# Patient Record
Sex: FEMALE | Race: WHITE | URBAN - METROPOLITAN AREA
[De-identification: names, ages, dates, MRNs, and addresses within clinical notes are randomized per-mention and may not be internally consistent; named-entity substitution may affect disease eponyms.]

---

## 2024-08-15 DIAGNOSIS — C43.9 MALIGNANT MELANOMA, UNSPECIFIED SITE (HCC): Primary | ICD-10-CM

## 2024-08-16 ENCOUNTER — LAB ENCOUNTER (OUTPATIENT)
Dept: LAB | Facility: HOSPITAL | Age: 49
End: 2024-08-16
Attending: SURGERY
Payer: COMMERCIAL

## 2024-08-16 DIAGNOSIS — C43.61 MALIGNANT MELANOMA OF ARM, RIGHT (HCC): Primary | ICD-10-CM

## 2024-08-16 PROCEDURE — 88321 CONSLTJ&REPRT SLD PREP ELSWR: CPT

## 2024-08-20 PROBLEM — J02.9 ACUTE PHARYNGITIS: Status: ACTIVE | Noted: 2024-08-20

## 2024-08-20 PROBLEM — J06.9 ACUTE UPPER RESPIRATORY INFECTION: Status: ACTIVE | Noted: 2024-08-20

## 2024-08-20 NOTE — H&P (VIEW-ONLY)
[facility]        Patient Name:  Tonia Bhardwaj   YOB: 1975   Gender:  Female   Appt Date:  8/21/2024   Provider:  Mg Yarbrough MD     PATIENT PROVIDERS  Referring Provider: Monique Schaeffer MD          CHIEF COMPLAINT  Chief Complaint   Patient presents with    Consult        PROBLEMS  Reviewed   Patient Active Problem List   Diagnosis    Acute pharyngitis    Acute upper respiratory infection    Cellulitis    Child attention deficit disorder    Hidradenitis    Malignant melanoma of right forearm (HCC)        History of Present Illness:  Patient is a 48 year old female who is currently being referred for consideration of surgical oncology management of recently diagnosed right forearm melanoma.     Patient was seen in office by Dr. Monique Schaeffer-->  Punch biopsy done that reveals a 0.2 mm breslow depth.     She states she's had this mole since she was little and has noticed that the borders have gotten bigger and spreading. She does recall having a history of a blistering sunburn.        Vital Signs:  /76 (BP Location: Right arm, Patient Position: Sitting, Cuff Size: adult)   Pulse 106   Temp 98.4 °F (36.9 °C) (Temporal)   Resp 20   Ht 1.562 m (5' 1.5\")   Wt 83.1 kg (183 lb 3.2 oz)   SpO2 100%   BMI 34.05 kg/m²      Medications Reviewed:    Current Outpatient Medications:     montelukast 10 MG Oral Tab, Take 1 tablet (10 mg total) by mouth daily., Disp: , Rfl:     fexofenadine (ALLEGRA ALLERGY) 180 MG Oral Tab, Take 1 tablet (180 mg total) by mouth daily., Disp: , Rfl:     COMBIPATCH 0.05-0.14 MG/DAY Transdermal Patch Biweekly, , Disp: , Rfl:      Allergies Reviewed:  Allergies   Allergen Reactions    Other RASH     Adhesive products    Sulfamethoxazole UNKNOWN    Trimethoprim UNKNOWN    Clindamycin RASH        History:  Reviewed:  Past Medical History:    ADD (attention deficit disorder)    Cellulitis    Hidradenitis      Reviewed:  History reviewed. No  pertinent surgical history.   Reviewed Social History:  Social History     Socioeconomic History    Marital status: Single   Tobacco Use    Smoking status: Never    Smokeless tobacco: Never   Vaping Use    Vaping status: Never Used   Substance and Sexual Activity    Alcohol use: Yes     Comment: 1-2 x/yr    Drug use: Never     Social Determinants of Health      Received from [facility]      Reviewed:  Family History   Problem Relation Age of Onset    Breast Cancer Mother       Review of Systems:  GENERAL HEALTH: feels well, no fatigue.   SKIN: change in mole  HEENT: denies pain  RESPIRATORY: denies shortness of breath  CARDIOVASCULAR: denies chest pain  GI: denies nausea, vomiting, constipation, diarrhea; no rectal bleeding  GENITAL/: no blood in urine  MUSCULOSKELETAL: no joint complaints, no back pain  NEURO: no tingling, numbness, weakness  ENDOCRINE: denies weight loss/gain       Physical Examination:  Constitutional: NAD.   Eyes: Sclera: non-icteric.   Neck: Neck: supple.   Lymph Nodes: Lymph Nodes no cervical LAD, supraclavicular LAD, axillary LAD, or inguinal LAD.   Lungs: Auscultation: breath sounds normal.   Cardiovascular: Heart Auscultation: RRR.   Abdomen: Soft, no masses  Musculoskeletal: Extremities: no edema.   Skin: 3 cm pigmented, irregular skin lesion right forearm.  No satellite lesions.  No in-transit metastasis.  No epitrochlear lymphadenopathy.  Remainder of skin examination unremarkable.       Document Review:  Dermpathology Report:   Case # [id number]  Skin, right lower forearm punch biopsy: invasive melanoma  Measured tumor thickness (Breslow depth): 0.2 mm   Level of invasion(anatomic nav level): II  Epidermal ulceration: Absent  Histologic type: Superficial spreading  Tumor regression: Not identified  Predominant cell type: Epithelioid   Associated Nevus: Not identified   Mitotic rate: 0 per mm squared  Tumor infiltrating lymphocytes (minimal or brisk):  Non-brisk  Peripheral: involved   Deep: Uninvolved   Primary tumor: pT1a  Lymph node pNX  Distant metastasis: pMx      Procedure(s):  None     Assessment / Plan:  Malignant melanoma of skin of right forearm, pT1a cN0  Findings were discussed with patient at length.  Entity melanoma and its staging discussed.  Patient understands that she will require a wide excision with a 1 cm margin.  Given resultant defect, patient will also meet with Dr. Roman from plastic surgery today for consideration of reconstruction.  The surgical treatment matter including indications, rationale, and risks was discussed in detail.  Patient agreed and understood.  Arrangements will be made to schedule the procedure.  Patient had ample time to ask questions.         Follow Up:  To schedule wide excision under local anesthesia pending plastic surgery consultation.       Electronically Signed by: Mg Yarbrough MD

## 2024-08-20 NOTE — CONSULTS
[facility]        Patient Name:  Tonia Bhardwaj   YOB: 1975   Gender:  Female   Appt Date:  8/21/2024   Provider:  Mg Yarbrough MD     PATIENT PROVIDERS  Referring Provider: Monique Schaeffer MD          CHIEF COMPLAINT  Chief Complaint   Patient presents with    Consult        PROBLEMS  Reviewed   Patient Active Problem List   Diagnosis    Acute pharyngitis    Acute upper respiratory infection    Cellulitis    Child attention deficit disorder    Hidradenitis    Malignant melanoma of right forearm (HCC)        History of Present Illness:  Patient is a 48 year old female who is currently being referred for consideration of surgical oncology management of recently diagnosed right forearm melanoma.     Patient was seen in office by Dr. Monique Schaeffer-->  Punch biopsy done that reveals a 0.2 mm breslow depth.     She states she's had this mole since she was little and has noticed that the borders have gotten bigger and spreading. She does recall having a history of a blistering sunburn.        Vital Signs:  /76 (BP Location: Right arm, Patient Position: Sitting, Cuff Size: adult)   Pulse 106   Temp 98.4 °F (36.9 °C) (Temporal)   Resp 20   Ht 1.562 m (5' 1.5\")   Wt 83.1 kg (183 lb 3.2 oz)   SpO2 100%   BMI 34.05 kg/m²      Medications Reviewed:    Current Outpatient Medications:     montelukast 10 MG Oral Tab, Take 1 tablet (10 mg total) by mouth daily., Disp: , Rfl:     fexofenadine (ALLEGRA ALLERGY) 180 MG Oral Tab, Take 1 tablet (180 mg total) by mouth daily., Disp: , Rfl:     COMBIPATCH 0.05-0.14 MG/DAY Transdermal Patch Biweekly, , Disp: , Rfl:      Allergies Reviewed:  Allergies   Allergen Reactions    Other RASH     Adhesive products    Sulfamethoxazole UNKNOWN    Trimethoprim UNKNOWN    Clindamycin RASH        History:  Reviewed:  Past Medical History:    ADD (attention deficit disorder)    Cellulitis    Hidradenitis      Reviewed:  History reviewed. No  pertinent surgical history.   Reviewed Social History:  Social History     Socioeconomic History    Marital status: Single   Tobacco Use    Smoking status: Never    Smokeless tobacco: Never   Vaping Use    Vaping status: Never Used   Substance and Sexual Activity    Alcohol use: Yes     Comment: 1-2 x/yr    Drug use: Never     Social Determinants of Health      Received from [facility]      Reviewed:  Family History   Problem Relation Age of Onset    Breast Cancer Mother       Review of Systems:  GENERAL HEALTH: feels well, no fatigue.   SKIN: change in mole  HEENT: denies pain  RESPIRATORY: denies shortness of breath  CARDIOVASCULAR: denies chest pain  GI: denies nausea, vomiting, constipation, diarrhea; no rectal bleeding  GENITAL/: no blood in urine  MUSCULOSKELETAL: no joint complaints, no back pain  NEURO: no tingling, numbness, weakness  ENDOCRINE: denies weight loss/gain       Physical Examination:  Constitutional: NAD.   Eyes: Sclera: non-icteric.   Neck: Neck: supple.   Lymph Nodes: Lymph Nodes no cervical LAD, supraclavicular LAD, axillary LAD, or inguinal LAD.   Lungs: Auscultation: breath sounds normal.   Cardiovascular: Heart Auscultation: RRR.   Abdomen: Soft, no masses  Musculoskeletal: Extremities: no edema.   Skin: 3 cm pigmented, irregular skin lesion right forearm.  No satellite lesions.  No in-transit metastasis.  No epitrochlear lymphadenopathy.  Remainder of skin examination unremarkable.       Document Review:  Dermpathology Report:   Case # [id number]  Skin, right lower forearm punch biopsy: invasive melanoma  Measured tumor thickness (Breslow depth): 0.2 mm   Level of invasion(anatomic nav level): II  Epidermal ulceration: Absent  Histologic type: Superficial spreading  Tumor regression: Not identified  Predominant cell type: Epithelioid   Associated Nevus: Not identified   Mitotic rate: 0 per mm squared  Tumor infiltrating lymphocytes (minimal or brisk):  Non-brisk  Peripheral: involved   Deep: Uninvolved   Primary tumor: pT1a  Lymph node pNX  Distant metastasis: pMx      Procedure(s):  None     Assessment / Plan:  Malignant melanoma of skin of right forearm, pT1a cN0  Findings were discussed with patient at length.  Entity melanoma and its staging discussed.  Patient understands that she will require a wide excision with a 1 cm margin.  Given resultant defect, patient will also meet with Dr. Roman from plastic surgery today for consideration of reconstruction.  The surgical treatment matter including indications, rationale, and risks was discussed in detail.  Patient agreed and understood.  Arrangements will be made to schedule the procedure.  Patient had ample time to ask questions.         Follow Up:  To schedule wide excision under local anesthesia pending plastic surgery consultation.       Electronically Signed by: Mg Yarbrough MD

## 2024-08-21 ENCOUNTER — OFFICE VISIT (OUTPATIENT)
Dept: SURGERY | Facility: CLINIC | Age: 49
End: 2024-08-21
Payer: COMMERCIAL

## 2024-08-21 VITALS
HEART RATE: 106 BPM | OXYGEN SATURATION: 100 % | SYSTOLIC BLOOD PRESSURE: 131 MMHG | DIASTOLIC BLOOD PRESSURE: 76 MMHG | BODY MASS INDEX: 34.14 KG/M2 | RESPIRATION RATE: 20 BRPM | TEMPERATURE: 98 F | HEIGHT: 61.5 IN | WEIGHT: 183.19 LBS

## 2024-08-21 DIAGNOSIS — C43.61 MALIGNANT MELANOMA OF RIGHT FOREARM (HCC): Primary | ICD-10-CM

## 2024-08-21 PROCEDURE — 3078F DIAST BP <80 MM HG: CPT | Performed by: SURGERY

## 2024-08-21 PROCEDURE — 3075F SYST BP GE 130 - 139MM HG: CPT | Performed by: SURGERY

## 2024-08-21 PROCEDURE — 3008F BODY MASS INDEX DOCD: CPT | Performed by: SURGERY

## 2024-08-21 PROCEDURE — 99245 OFF/OP CONSLTJ NEW/EST HI 55: CPT | Performed by: SURGERY

## 2024-08-21 PROCEDURE — 99243 OFF/OP CNSLTJ NEW/EST LOW 30: CPT | Performed by: SURGERY

## 2024-08-21 NOTE — PATIENT INSTRUCTIONS
Surgery:  Wide excision melanoma right forearm    Date of Surgery:    Hospital:    [facility]- [address] [place] [address]  Phone: [phone]    This is an Outpatient procedure.  Use the provided Chlorhexadine surgical soap(instructions attached) to shower the night before and morning of your procedure.  Do not apply powders, creams, lotions or deodorant after showering.  Do not apply any kind of makeup and make sure to remove nail polish prior to your surgery.  If you take Insulin contact your primary care physician for specific instructions regarding dosing around your surgery.  Do not drink alcohol or smoke tobacco products 24 hours prior to procedure.   Bring your picture ID and insurance card with you.  Wear comfortable clothing that can easily be removed. Preferably, something that zips, snaps, or buttons up the front.   You will be contacted by the hospital  for pre-admission COVID-19 testing and the day prior to your surgery to confirm details and give you specific instructions about when and where to arrive the day of your procedure.   If you are taking blood thinners including: Plavix, Eliquis, Xarelto, Coumadin, full strength Aspirin you will need to contact the prescribing provider for specific instructions on holding these medications for your procedure.  Inform your primary care physician of your surgery and ask if him/her will need to see you prior to surgery.  If you develop symptoms of another illness prior to surgery please contact our office immediately.   If this is an inpatient surgery, attending the Surgical Oncology Pre-operative Education Class is strongly encouraged. Email [email or web address] to RSVP or for more class information.       Pre-Operative Testing   CBC  CMP  BMP    PT, PTT, INR  UA  EKG    Chest X-Ray  Cardiac Clearance  H & P Medical Clearance     Mg Yarbrough MD, FACS  Chief of Surgical Oncology  Co-Medical Director, Oncology  Services  Professor of Surgery    For Dr. Yarbrough's office: [phone]  Fax: [phone]  After hours you will reach the answering service    Pre-Admission [phone]  Central [phone]  Medical Records:   [phone]    Diagnosis: Malignant melanoma of skin of right forearm     SURGEON: Dr. Mg Yarbrough    LOCATION: [facility]    Type: Outpatient    Length of surgery: 30 minutes  Anesthesia:MAC  Joint case with:  Dr. Roman  SA:  No   Special Equipment:   Special request:     Allergies:   Allergies   Allergen Reactions    Other RASH     Adhesive products    Sulfamethoxazole UNKNOWN    Trimethoprim UNKNOWN    Clindamycin RASH       Orders:  No  -Colon Bundle/Bowel Prep  No  -Type and cross 2 units PRBC  No  -Type and Screen  No  -Advanced Oncology Order Set (HIPEC)  No  -Heparin Pre-Op  No  -Nuclear Med Injection  No  -Wire localization needed  No  -Midline placement (HIPIC, Whipple, Esophagectomy, Liver)  No-Tylenol administration prior to surgery  Does patient have a pacemaker?: No    No  -CHG Cloths (Ricardo)    P.A.T. orders:     For RN use only:

## 2024-08-21 NOTE — PATIENT INSTRUCTIONS
Surgeon:              Dr. Julia Roman, PhD                                          Tel:         [phone]                                  Fax:        [phone]      Surgery/Procedure: Right forearm reconstruction with local flap, possible grafts, possible Integra  1.5 hours for Dr. Roman's portion, joint with Dr. Yarbrough, MAC anesthesia, outpatient  And  3 weeks later  Right forearm reconstruction with local flap, possible grafts  1.5 hours, MAC anesthesia, outpatient     Dx Code: [C43.61]     Hospital:  [facility]: [address] [place] [address]           [phone]  [facility]: [address], [place] [address]               [phone]    1. Someone will need to drive you to and from the hospital if your procedure is outpatient.    2.Do not drink alcohol or smoke 24 hours prior to your procedure.    3. Bring a picture ID and your insurance card.    4. You will be contacted by the hospital the day before to confirm the procedure time and location.     5. Do not take any herbal supplements or blood thinners at least one week before your procedure/surgery. This includes NSAID's (aspirin, baby aspirin, Motrin, Ibuprofen, Aleve, Advil, Naproxen, etc), fish oil, vitamin E, turmeric, CoQ10, or green tea supplements, etc. *TYLENOL or acetaminophen is ok to take*    6. PRE-OPERATIVE TESTING: History and physical with medical clearance is REQUIRED within 30 days of the surgery date and is mandatory per Dr. Roman. *If this is not done, your surgery will be postponed*  MEDICAL CLEARANCE WITH DR. MEYER  CBC  CMP  EKG (within 90 days)    7. Please inform us if you start or change any medications at least one week before surgery (ex: blood thinners, weight loss medications, diabetic medications, herbal supplements, etc)    8. Does patient have diagnosis of sleep apnea?    [   ] Yes     [ X ]  No    Consent obtained 8/21/2024  Photos taken on 8/21/2024

## 2024-08-21 NOTE — CONSULTS
New Patient Consultation    Chief Complaint: Malignant melanoma of right forearm     History of Present Illness:   Tonia Bhardwaj is a 48 year old female referred by Dr. Yarbrough for evaluation of a recently diagnosed right forearm melanoma. This was diagnosed on 7/31/2024 via punch biopsy. The patient met with Dr. Yarbrough who is recommending wide excision with a 1 cm margin. She is here today to discuss options for reconstruction.      Pathology from 7/31/2024:    FINAL DIAGNOSIS  1.) Skin, right lower forearm punch biopsy: Invasive melanoma. See checklist and comment.    Melanoma checklist:   Measured tumor thickness (Breslow depth): 0.2 mm   Level of invasion (anatomic/Osmany level): II   Epidermal ulceration: Absent    Additional information:   Histologic type: Favor superficial spreading type   Lymphovascular invasion: Not identified   Neurotropism: Not identified   Tumor regression: Not identified    Predominant cell tyle: Epithelioid    Associated nevus: Not identified   Mitotic rate: 0 per mm squared   Tumor infiltrating lymphocytes (minimal or brisk): Non-brisk     Tumor distance to nearest margin:   Peripheral: Involved   Deep: Uninvolved    Pathologic stage:   Primary tumor: pT1a   Lymph node: pNX   Distant metastasis: pMX        Past Medical History:      Past Medical History:    ADD (attention deficit disorder)    Cellulitis    Hidradenitis         Past Surgical History:  No past surgical history on file.      Medications:    No outpatient medications have been marked as taking for the 8/21/24 encounter (Office Visit) with Julia Roman MD.         Allergies:    Allergies   Allergen Reactions    Other RASH     Adhesive products    Sulfamethoxazole UNKNOWN    Trimethoprim UNKNOWN    Clindamycin RASH         Family History:   Family History   Problem Relation Age of Onset    Breast Cancer Mother          Social History:  History   Alcohol Use    Yes     Comment: 1-2 x/yr       History   Smoking Status     Never   Smokeless Tobacco    Never       History   Drug Use Unknown       Review of Systems:    A 13 point review of systems was performed on the intake sheet.  The patient reports   General:   The patient denies, fever, chills, night sweats, fatigue, generalized weakness, change in appetite, weight loss, or weight gain.  Endocrine:   There is no history of poor/slow wound healing, weight loss/gain, fertility or hormone problems, cold intolerance, heat intolerance, excessive thirst, or thyroid disease.   Allergic/Immunologic:  There is no history of hives, hay fever, angioedema or anaphylaxis.  HEENT:    The patient denies ear pain, ear drainage, hearing loss, change in vision, double vision, cataracts, glaucoma, nasal congestion, nosebleed, hoarseness, sore throat, or swollen glands  Respiratory:   The patient denies shortness of breath, cough, bloody cough, phlegm, asthma, or wheezing  Cardiovascular:  The patient denies chest pain/pressure, palpitations, irregular heartbeat, high blood pressure, stroke, or leg swelling  Breasts:  Patient denies breast masses, pain, change in the breast skin, skin dimpling, nipple discharge, or rash  Gastrointestinal:   There is no history of difficulty or pain with swallowing, reflux symptoms, nausea, vomiting, dark/ bloody stools, diarrhea, constipation,  change in bowel habits, or abdominal pain.   Genitourinary:  The patient denies frequent urination, needing to get up at night to urinate, urinary hesitancy or retaining urine, painful urination, urinary incontinence, decreased urine stream, blood in the urine, or vaginal/penile discharge.  Skin:   The patient denies rash, itching, skin lesions, dry skin, change in skin color or +change in moles, sunburns, or sunburns with blistering.   Hematologic/Lymphatic:  The patient denies easily bruising or bleeding, persistent swollen glands or lymph nodes, bleeding disorders, blood clots, or pulmonary embolism.   Gynecologic:  The  patient denies irregular menses, pelvic pain, pain with intercourse, painful menses, or pregnancy  Musculoskeletal:  The patient denies muscle aches/pain, joint pain, stiff joints, neck pain, back pain or bone pain.  Neurologic:  There is no history of migraines or severe headaches, seizure/epilepsy, speech problems, coordination problems, trembling/tremors, fainting/black outs, dizziness, memory problems, loss of sensation/numbness, problems walking, weakness, tingling or burning in hands/feet.   Psychiatric:  There is no history of abusive relationship, bipolar disorder, sleep disturbance, anxiety, depression or feeling of despair.    Physical Exam:    There were no vitals taken for this visit.    Constitutional: The patient is an alert, oriented and well-developed.     Neurologic: Speech patterns and movements are normal.     Psychiatric: Affect is appropriate.    Eyes: Conjunctiva are clear, non-icteric. PERRL    ENT: no obvious abnormality, no ear drainage, mucous membranes moist and pink    Integument/Skin: Right forearm pigmented lesion    Respiratory: Normal respiratory effort.     Cardiovascular: no cyanosis, no edema    Musculoskeletal: Extremities unremarkable, without edema, tenderness or deformities    Impression:   Tonia Bhardwaj  is a 48 year old with malignant melanoma right forearm    Discussion and Plan:  The patient was counseled on the different treatment options.     Dr. Yarbrough is planning for wide excision with a 1 cm margin. We discussed the option for right forearm reconstruction with local flap, possible grafts, possible Integra. The patient is interested to do this under local anesthesia, however she understands that due to the anticipated defect 1cm margin from the pigmented area she will need MAC anesthesia. Patient is agreeable with this plan. She is overall ok with scars from flaps or grafts as needed. Pictures were shown to her.     The patient reports several allergies including a  significant history of adhesive reactions. She is ok with Vaseline and Coban. We will test Steri-Strips on a small area of her arm. We will likely need to avoid any kind of surgical glue including Dermabond.     The different treatment options were discussed with the patient.  The procedures and the postoperative care was discussed in detail.  Potential risks complications benefits and alternatives were discussed.  Risks and complications including but not limited to infection, bleeding, scarring, damage to surrounding structures, such as nerves, blood vessels, muscles,  tendons, risk of hematoma, seroma, foreign body reaction, allergic reaction, wound dehiscences, delayed wound healing, graft failure, flap failure, need for further surgery.    Patient understands and wishes to proceed with the procedure, questions were answered.    45 minutes were spent with the patient, from which 35 minutes were spent counseling the patient and coordinating care.

## 2024-08-22 DIAGNOSIS — C43.61 MALIGNANT MELANOMA OF RIGHT FOREARM (HCC): Primary | ICD-10-CM

## 2024-09-05 RX ORDER — ACETAMINOPHEN 500 MG
1000 TABLET ORAL ONCE
Status: CANCELLED | OUTPATIENT
Start: 2024-09-05 | End: 2024-09-05

## 2024-09-05 RX ORDER — SODIUM CHLORIDE, SODIUM LACTATE, POTASSIUM CHLORIDE, CALCIUM CHLORIDE 600; 310; 30; 20 MG/100ML; MG/100ML; MG/100ML; MG/100ML
INJECTION, SOLUTION INTRAVENOUS CONTINUOUS
Status: CANCELLED | OUTPATIENT
Start: 2024-09-05

## 2024-09-05 RX ORDER — HEPARIN SODIUM 5000 [USP'U]/ML
5000 INJECTION, SOLUTION INTRAVENOUS; SUBCUTANEOUS ONCE
Status: CANCELLED | OUTPATIENT
Start: 2024-09-05 | End: 2024-09-05

## 2024-09-17 ENCOUNTER — ANESTHESIA (OUTPATIENT)
Dept: SURGERY | Facility: HOSPITAL | Age: 49
End: 2024-09-17
Payer: COMMERCIAL

## 2024-09-17 ENCOUNTER — ANESTHESIA EVENT (OUTPATIENT)
Dept: SURGERY | Facility: HOSPITAL | Age: 49
End: 2024-09-17
Payer: COMMERCIAL

## 2024-09-17 ENCOUNTER — HOSPITAL ENCOUNTER (OUTPATIENT)
Facility: HOSPITAL | Age: 49
Setting detail: HOSPITAL OUTPATIENT SURGERY
Discharge: HOME | End: 2024-09-17
Attending: SURGERY | Admitting: SURGERY
Payer: COMMERCIAL

## 2024-09-17 VITALS
SYSTOLIC BLOOD PRESSURE: 115 MMHG | WEIGHT: 185.63 LBS | RESPIRATION RATE: 21 BRPM | OXYGEN SATURATION: 95 % | TEMPERATURE: 98 F | DIASTOLIC BLOOD PRESSURE: 74 MMHG | HEIGHT: 62.5 IN | HEART RATE: 78 BPM | BODY MASS INDEX: 33.31 KG/M2

## 2024-09-17 DIAGNOSIS — C43.61 MALIGNANT MELANOMA OF RIGHT FOREARM (HCC): ICD-10-CM

## 2024-09-17 LAB
B-HCG UR QL: NEGATIVE
B-HCG UR QL: NEGATIVE

## 2024-09-17 PROCEDURE — 81025 URINE PREGNANCY TEST: CPT

## 2024-09-17 PROCEDURE — 88307 TISSUE EXAM BY PATHOLOGIST: CPT | Performed by: SURGERY

## 2024-09-17 PROCEDURE — 88305 TISSUE EXAM BY PATHOLOGIST: CPT | Performed by: SURGERY

## 2024-09-17 PROCEDURE — 0HXDXZZ TRANSFER RIGHT LOWER ARM SKIN, EXTERNAL APPROACH: ICD-10-PCS | Performed by: SURGERY

## 2024-09-17 PROCEDURE — 0JUG37Z SUPPLEMENT OF RIGHT LOWER ARM SUBCUTANEOUS TISSUE AND FASCIA WITH AUTOLOGOUS TISSUE SUBSTITUTE, PERCUTANEOUS APPROACH: ICD-10-PCS | Performed by: SURGERY

## 2024-09-17 RX ORDER — HEPARIN SODIUM 5000 [USP'U]/ML
5000 INJECTION, SOLUTION INTRAVENOUS; SUBCUTANEOUS ONCE
Status: COMPLETED | OUTPATIENT
Start: 2024-09-17 | End: 2024-09-17

## 2024-09-17 RX ORDER — LIDOCAINE HYDROCHLORIDE 10 MG/ML
INJECTION, SOLUTION EPIDURAL; INFILTRATION; INTRACAUDAL; PERINEURAL AS NEEDED
Status: DISCONTINUED | OUTPATIENT
Start: 2024-09-17 | End: 2024-09-17 | Stop reason: SURG

## 2024-09-17 RX ORDER — HYDROMORPHONE HYDROCHLORIDE 1 MG/ML
0.2 INJECTION, SOLUTION INTRAMUSCULAR; INTRAVENOUS; SUBCUTANEOUS EVERY 5 MIN PRN
Status: DISCONTINUED | OUTPATIENT
Start: 2024-09-17 | End: 2024-09-17

## 2024-09-17 RX ORDER — METOCLOPRAMIDE 10 MG/1
10 TABLET ORAL EVERY 8 HOURS PRN
Qty: 15 TABLET | Refills: 0 | Status: SHIPPED | OUTPATIENT
Start: 2024-09-17

## 2024-09-17 RX ORDER — NALOXONE HYDROCHLORIDE 0.4 MG/ML
0.08 INJECTION, SOLUTION INTRAMUSCULAR; INTRAVENOUS; SUBCUTANEOUS AS NEEDED
Status: DISCONTINUED | OUTPATIENT
Start: 2024-09-17 | End: 2024-09-17

## 2024-09-17 RX ORDER — SODIUM CHLORIDE, SODIUM LACTATE, POTASSIUM CHLORIDE, CALCIUM CHLORIDE 600; 310; 30; 20 MG/100ML; MG/100ML; MG/100ML; MG/100ML
INJECTION, SOLUTION INTRAVENOUS CONTINUOUS
Status: DISCONTINUED | OUTPATIENT
Start: 2024-09-17 | End: 2024-09-17

## 2024-09-17 RX ORDER — ACETAMINOPHEN 500 MG
1000 TABLET ORAL ONCE AS NEEDED
Status: DISCONTINUED | OUTPATIENT
Start: 2024-09-17 | End: 2024-09-17

## 2024-09-17 RX ORDER — HYDROCODONE BITARTRATE AND ACETAMINOPHEN 5; 325 MG/1; MG/1
2 TABLET ORAL ONCE AS NEEDED
Status: DISCONTINUED | OUTPATIENT
Start: 2024-09-17 | End: 2024-09-17

## 2024-09-17 RX ORDER — BUPIVACAINE HYDROCHLORIDE 2.5 MG/ML
INJECTION, SOLUTION EPIDURAL; INFILTRATION; INTRACAUDAL AS NEEDED
Status: DISCONTINUED | OUTPATIENT
Start: 2024-09-17 | End: 2024-09-17 | Stop reason: HOSPADM

## 2024-09-17 RX ORDER — TRAMADOL HYDROCHLORIDE 50 MG/1
50 TABLET ORAL EVERY 6 HOURS PRN
Qty: 15 TABLET | Refills: 0 | Status: SHIPPED | OUTPATIENT
Start: 2024-09-17

## 2024-09-17 RX ORDER — HYDROCODONE BITARTRATE AND ACETAMINOPHEN 5; 325 MG/1; MG/1
1 TABLET ORAL ONCE AS NEEDED
Status: DISCONTINUED | OUTPATIENT
Start: 2024-09-17 | End: 2024-09-17

## 2024-09-17 RX ORDER — LIDOCAINE HYDROCHLORIDE AND EPINEPHRINE 10; 10 MG/ML; UG/ML
INJECTION, SOLUTION INFILTRATION; PERINEURAL AS NEEDED
Status: DISCONTINUED | OUTPATIENT
Start: 2024-09-17 | End: 2024-09-17 | Stop reason: HOSPADM

## 2024-09-17 RX ORDER — PROCHLORPERAZINE EDISYLATE 5 MG/ML
5 INJECTION INTRAMUSCULAR; INTRAVENOUS EVERY 8 HOURS PRN
Status: DISCONTINUED | OUTPATIENT
Start: 2024-09-17 | End: 2024-09-17

## 2024-09-17 RX ORDER — HYDROMORPHONE HYDROCHLORIDE 1 MG/ML
0.6 INJECTION, SOLUTION INTRAMUSCULAR; INTRAVENOUS; SUBCUTANEOUS EVERY 5 MIN PRN
Status: DISCONTINUED | OUTPATIENT
Start: 2024-09-17 | End: 2024-09-17

## 2024-09-17 RX ORDER — PHENYLEPHRINE HCL 10 MG/ML
VIAL (ML) INJECTION AS NEEDED
Status: DISCONTINUED | OUTPATIENT
Start: 2024-09-17 | End: 2024-09-17 | Stop reason: SURG

## 2024-09-17 RX ORDER — ONDANSETRON 2 MG/ML
4 INJECTION INTRAMUSCULAR; INTRAVENOUS EVERY 6 HOURS PRN
Status: DISCONTINUED | OUTPATIENT
Start: 2024-09-17 | End: 2024-09-17

## 2024-09-17 RX ORDER — MIDAZOLAM HYDROCHLORIDE 1 MG/ML
INJECTION INTRAMUSCULAR; INTRAVENOUS AS NEEDED
Status: DISCONTINUED | OUTPATIENT
Start: 2024-09-17 | End: 2024-09-17 | Stop reason: SURG

## 2024-09-17 RX ORDER — HYDROMORPHONE HYDROCHLORIDE 1 MG/ML
0.4 INJECTION, SOLUTION INTRAMUSCULAR; INTRAVENOUS; SUBCUTANEOUS EVERY 5 MIN PRN
Status: DISCONTINUED | OUTPATIENT
Start: 2024-09-17 | End: 2024-09-17

## 2024-09-17 RX ORDER — ONDANSETRON 2 MG/ML
INJECTION INTRAMUSCULAR; INTRAVENOUS AS NEEDED
Status: DISCONTINUED | OUTPATIENT
Start: 2024-09-17 | End: 2024-09-17 | Stop reason: SURG

## 2024-09-17 RX ORDER — ACETAMINOPHEN 500 MG
1000 TABLET ORAL ONCE
Status: DISCONTINUED | OUTPATIENT
Start: 2024-09-17 | End: 2024-09-17 | Stop reason: HOSPADM

## 2024-09-17 RX ORDER — ONDANSETRON 2 MG/ML
INJECTION INTRAMUSCULAR; INTRAVENOUS
Status: COMPLETED
Start: 2024-09-17 | End: 2024-09-17

## 2024-09-17 RX ADMIN — SODIUM CHLORIDE, SODIUM LACTATE, POTASSIUM CHLORIDE, CALCIUM CHLORIDE: 600; 310; 30; 20 INJECTION, SOLUTION INTRAVENOUS at 08:39:00

## 2024-09-17 RX ADMIN — ONDANSETRON 4 MG: 2 INJECTION INTRAMUSCULAR; INTRAVENOUS at 08:44:00

## 2024-09-17 RX ADMIN — PHENYLEPHRINE HCL 100 MCG: 10 MG/ML VIAL (ML) INJECTION at 09:52:00

## 2024-09-17 RX ADMIN — LIDOCAINE HYDROCHLORIDE 50 MG: 10 INJECTION, SOLUTION EPIDURAL; INFILTRATION; INTRACAUDAL; PERINEURAL at 08:43:00

## 2024-09-17 RX ADMIN — MIDAZOLAM HYDROCHLORIDE 2 MG: 1 INJECTION INTRAMUSCULAR; INTRAVENOUS at 08:39:00

## 2024-09-17 RX ADMIN — SODIUM CHLORIDE, SODIUM LACTATE, POTASSIUM CHLORIDE, CALCIUM CHLORIDE: 600; 310; 30; 20 INJECTION, SOLUTION INTRAVENOUS at 10:33:00

## 2024-09-17 RX ADMIN — PHENYLEPHRINE HCL 100 MCG: 10 MG/ML VIAL (ML) INJECTION at 09:32:00

## 2024-09-17 RX ADMIN — PHENYLEPHRINE HCL 100 MCG: 10 MG/ML VIAL (ML) INJECTION at 09:24:00

## 2024-09-17 NOTE — BRIEF OP NOTE
Pre-Operative Diagnosis: Malignant melanoma of right forearm (HCC) [C43.61]     Post-Operative Diagnosis: Malignant melanoma of right forearm (HCC) [C43.61]      Procedure Performed:   Wide excision melanoma right forearm (Lin[place])    Surgeons and Role:  Panel 1:     * Mg Yarbrough MD - Primary  Panel 2:     * Julia Roman MD - Primary    Assistant(s):  PA: Christal Russell PA     Surgical Findings: See full operative report     Specimen: Yes     Estimated Blood Loss: Blood Output: 3 mL (9/17/2024  9:15 AM)    MITRA Hirsch  9/17/2024  9:19 AM

## 2024-09-17 NOTE — ANESTHESIA POSTPROCEDURE EVALUATION
[facility]    Tonia Bhardwaj Patient Status:  Hospital Outpatient Surgery   Age/Gender 49 year old female MRN [id number]   Location [facility] SURGERY Attending Mg Yarbrough MD   Hosp Day # 0 PCP Leah Sevilla MD       Anesthesia Post-op Note    Wide excision melanoma right forearm ([place])    Procedure Summary       Date: 09/17/24 Room / Location:  MAIN OR 06 / [facility] MAIN OR    Anesthesia Start: 0839 Anesthesia Stop: 1048    Procedures:       Wide excision melanoma right forearm (Linnea) (Right)      .Right forearm reconstruction with local flap and dermal fat graft   (Abel) (Right) Diagnosis:       Malignant melanoma of right forearm (HCC)      (Malignant melanoma of right forearm (HCC) [C43.61])    Surgeons: Mg Yarbrough MD; Julia Roman MD Anesthesiologist: Sha Burnett MD    Anesthesia Type: MAC ASA Status: 2            Anesthesia Type: MAC    Vitals Value Taken Time   /82 09/17/24 1049   Temp 97.2 09/17/24 1049   Pulse 97 09/17/24 1049   Resp 18 09/17/24 1049   SpO2 100 09/17/24 1049       Patient Location: PACU    Anesthesia Type: general    Airway Patency: patent and extubated    Postop Pain Control: adequate    Mental Status: preanesthetic baseline    Nausea/Vomiting: none    Cardiopulmonary/Hydration status: stable euvolemic    Complications: no apparent anesthesia related complications    Postop vital signs: stable    Dental Exam: Unchanged from Preop    Patient to be discharged from PACU when criteria met.

## 2024-09-17 NOTE — ANESTHESIA PROCEDURE NOTES
Airway  Date/Time: 9/17/2024 8:53 AM  Urgency: elective    Airway not difficult    General Information and Staff    Patient location during procedure: OR  Anesthesiologist: Sha Burnett MD  Resident/CRNA: Karl Verduzco CRNA  Performed: CRNA   Performed by: Karl Verduzco CRNA  Authorized by: Sha Burnett MD      Indications and Patient Condition  Indications for airway management: anesthesia  Spontaneous Ventilation: absent  Sedation level: deep  Preoxygenated: yes  Patient position: sniffing  MILS maintained throughout  Mask difficulty assessment: 1 - vent by mask  Planned trial extubation    Final Airway Details  Final airway type: supraglottic airway      Successful airway: classic  Size 3       Number of attempts at approach: 1  Number of other approaches attempted: 0    Additional Comments  Dentition per pre op

## 2024-09-17 NOTE — INTERVAL H&P NOTE
There has been no significant change since Dr Yarbrough saw patient as documented in EPIC.   Surgery revisted.   To proceed as planned.      Patient seen and examined by MITRA Thakur

## 2024-09-17 NOTE — DISCHARGE INSTRUCTIONS
Plastic Surgery Home Care Instructions      We hope you were pleased with your care at [facility].  We wish you the best outcome and overall experience with your operation.  These instructions will help to minimize pain, optimize healing, and improve the likelihood of a successful result.    What To Expect  There may be some spotting of the incision lines for the next few days.  Mild bruising, mild swelling and discomfort in the surgical area is typical.     Bandages (Dressing)  Wear ace wrap at all times as tolerated except for hygiene purposes. This can be adjusted as needed.   Apply antibiotic ointment (neosporin, bacitracin, etc) daily to the incisions. Apply nonstick gauze and replace ace wrap and padding as needed.     Bathing/Showers  You can resume showering tomorrow. Let soap and water run over the incision, don't scrub.   No baths, swimming, or hot tubs until you receive medical permission    Pain Medication: Tramadol  Take one tablet every six hours as needed for pain.  Do not take narcotics, if you do not have pain.   Keep stools soft.  Take a stool softener (Colace).  You can take Miralax once or twice daily and/or Milk of Magnesia daily to help with constipation if needed.   Eating fruit will also help to prevent constipation    Over-The-Counter Medication  You can take Tylenol after surgery for pain relief as needed  You can take Aleve or ibuprofen starting 24 hours after surgery  Take as directed on the bottle    Home Medication  Resume your home medications as instructed  Do not resume herbal medications for two weeks    Diet  Resume your normal diet    Activity  No strenuous activity   You cannot return to physical exercise, sports, or gym workouts until you are allowed to participate in strenuous activity.    Driving  Do not drive, if you are taking pain medication.    Return to Work or School  You can return to work when you are not taking pain medication, if your work does not involve  strenuous activity.  Contact the office, if you need a medical note.     Follow-up Appointment   Our office will call you to set up a time    Questions or Concerns  Call Dr. Roman's office if you experience bleeding that is not controlled by holding pressure for 20 minutes.     Tonia   Thank you for coming to [facility] for your operation.  The nurses and the anesthesiologist try very hard to make sure you receive the best care possible.  Your trust in them is greatly appreciated.    Thanks so much,  Dr. Abel Christian PA-C

## 2024-09-17 NOTE — ANESTHESIA PREPROCEDURE EVALUATION
PRE-OP EVALUATION    Patient Name: Tonia Bhardwaj    Admit Diagnosis: Malignant melanoma of right forearm (HCC) [C43.61]    Pre-op Diagnosis: Malignant melanoma of right forearm (HCC) [C43.61]    Wide excision melanoma right forearm ([place]) Right forearm reconstruction with local flap, possible grafts, possible Integra (Abel)    Anesthesia Procedure: Wide excision melanoma right forearm ([place]) Right forearm reconstruction with local flap, possible grafts, possible Integra (Abel) (Right)  . (Right)    Surgeons and Role:  Panel 1:     * Mg Yarbrough MD - Primary  Panel 2:     * Julia Roman MD - Primary    Pre-op vitals reviewed.  Temp: 97.8 °F (36.6 °C)  Pulse: 83  Resp: 16  BP: 140/87  SpO2: 100 %  Body mass index is 33.41 kg/m².    Current medications reviewed.  Hospital Medications:   [Transfer Hold] acetaminophen (Tylenol Extra Strength) tab 1,000 mg  1,000 mg Oral Once    lactated ringers infusion   Intravenous Continuous    [COMPLETED] heparin (Porcine) 5000 UNIT/ML injection 5,000 Units  5,000 Units Subcutaneous Once    ceFAZolin (Ancef) 2g in 10mL IV syringe premix  2 g Intravenous Once       Outpatient Medications:     Medications Prior to Admission   Medication Sig Dispense Refill Last Dose    Cyanocobalamin (VITAMIN B-12 OR) Take by mouth.   9/10/2024    CREATINE OR Take by mouth.   9/3/2024    montelukast 10 MG Oral Tab Take 1 tablet (10 mg total) by mouth daily.   9/15/2024    fexofenadine (ALLEGRA ALLERGY) 180 MG Oral Tab Take 1 tablet (180 mg total) by mouth daily.   9/15/2024    COMBIPATCH 0.05-0.14 MG/DAY Transdermal Patch Biweekly    9/10/2024       Allergies: Adhesive tape, Clindamycin, Parabens, Sulfamethoxazole, and Trimethoprim      Anesthesia Evaluation    Patient summary reviewed.    Anesthetic Complications  (-) history of anesthetic complications         GI/Hepatic/Renal    Negative GI/hepatic/renal ROS.                             Cardiovascular        Exercise tolerance: good      MET: >4                                           Endo/Other    Negative endo/other ROS.                              Pulmonary    Negative pulmonary ROS.                       Neuro/Psych    Negative neuro/psych ROS.                                  Past Surgical History:   Procedure Laterality Date    Patient denies any surgical history       Social History     Socioeconomic History    Marital status: Single   Tobacco Use    Smoking status: Never    Smokeless tobacco: Never   Vaping Use    Vaping status: Never Used   Substance and Sexual Activity    Alcohol use: Yes     Comment: RARE    Drug use: Never     History   Drug Use Unknown     Available pre-op labs reviewed.               Airway      Mallampati: II  Mouth opening: >3 FB  TM distance: 4 - 6 cm  Neck ROM: full Cardiovascular    Cardiovascular exam normal.         Dental             Pulmonary    Pulmonary exam normal.                 Other findings              ASA: 2   Plan: MAC  NPO status verified and patient meets guidelines.          Plan/risks discussed with: patient                Present on Admission:  **None**

## 2024-09-17 NOTE — BRIEF OP NOTE
Pre-Operative Diagnosis: Malignant melanoma of right forearm (HCC) [C43.61]     Post-Operative Diagnosis: Malignant melanoma of right forearm (HCC) [C43.61]      Procedure Performed:   Right forearm reconstruction with local flap, dermal fat graft    Surgeons and Role:     * Julia Roman MD - Primary    Assistant(s):  Jasmin Christian PA-C       Surgical Findings: see dictation     Specimen: see pathology     Estimated Blood Loss: Blood Output: 2 mL (9/17/2024 10:42 AM)    MITRA Stout  9/17/2024  10:49 AM

## 2024-09-18 NOTE — OPERATIVE REPORT
[facility]    PATIENT'S NAME: ANUM BARAHONA   ATTENDING PHYSICIAN: Mg Yarbrough MD   OPERATING PHYSICIAN: Mg Yarbrough MD   PATIENT ACCOUNT#:   [id number]    LOCATION:  [facility] [place] 10  MEDICAL RECORD #:   [id number]       YOB: 1975  ADMISSION DATE:       09/17/2024      OPERATION DATE:  09/17/2024    OPERATIVE REPORT      PREOPERATIVE DIAGNOSIS:  Malignant melanoma, right forearm.    POSTOPERATIVE DIAGNOSIS:  Malignant melanoma, right forearm.    PROCEDURE:    1.   Wide excision, malignant melanoma, right forearm (4.5 cm).    2.   Wound closure with Dr. Roman, Plastic Surgery.     ASSISTANT:  Christal Russell PA-C.     ANESTHESIA:  General.    INDICATIONS:  Patient is a 49-year-old woman who underwent biopsy of pigmented lesion of the right forearm revealing 0.2 mm thick melanoma.  She presents today for wide excision and reconstruction.  The surgical treatment matter was discussed with her including indications and risks.    OPERATIVE TECHNIQUE:  The patient was brought to the operating room and was placed in supine position.  She was given general anesthesia by the anesthesiology service.  Sequential compression boots were placed.  The patient received preoperative intravenous antibiotic prophylaxis.  She was prepped and draped in normal sterile fashion.  A 1 cm margin was marked around the pigmented lesion of the right forearm, and a circular incision measuring about 4.5 cm in width was made and deepened to underlying muscle fascia.  The specimen was excised in toto, oriented, sent to Pathology for permanent section evaluation.  At this point, Dr. Roman presented for wound closure, and her portion of the dictation should be noted elsewhere.    Patient tolerated my portion of the procedure well without immediate complication.    Wide Local Excision for Primary Cutaneous Melanoma - Excision 1 (Lower Arm - Right)  Operation performed with curative intent Yes    Original Breslow thickness of the lesion  0.2 mm (to the tenth of a millimeter)   Clinical margin width  (measured from the edge of the lesion or the prior excision scar) 1 cm   Depth of excision Full-thickness skin/subcutaneous tissue down to fascia (melanoma)       Dictated By Mg Yarbrough MD  d: 09/17/2024 14:12:09  t: 09/17/2024 22:12:18  [facility] [id number]  [facility]/

## 2024-09-18 NOTE — OPERATIVE REPORT
[facility]    PATIENT'S NAME: ANUM BARAHONA   ATTENDING PHYSICIAN: Mg Yarbrough MD   OPERATING PHYSICIAN: Julia Roman MD   PATIENT ACCOUNT#:   [id number]    LOCATION:  [facility] 4 [facility] 10  MEDICAL RECORD #:   [id number]       YOB: 1975  ADMISSION DATE:       09/17/2024      OPERATION DATE:  09/17/2024    OPERATIVE REPORT      PREOPERATIVE DIAGNOSIS:  Forearm melanoma; open wound, right forearm.  POSTOPERATIVE DIAGNOSIS:  Forearm melanoma; open wound, right forearm.  PROCEDURE:  Right forearm reconstruction with local flap, ulna  local rotation advancement flap, dermal fat graft.     ASSISTANT:  Jasmin Christian PA-C.    ANESTHESIA:  General endotracheal anesthesia.    COMPLICATIONS:  None.    BLOOD LOSS:  Minimal.    INDICATIONS:  This is a 49-year-old female with a 0.2 mm thick melanoma on her right forearm.  Due to the size of the melanoma including a 1 cm margin excision, the anticipated defect was large; therefore, we discussed reconstructive options in the office preoperatively and the style.  We discussed the option for flap reconstruction as well as staged approach, Integra and skin graft placements.  Potential risks, complications, benefits and alternatives were discussed.  Risks and complications including, but not limited to, infection, bleeding, scarring, damage to surrounding structures, hematoma, seroma, flap failure, need for further surgery.  The patient understands and wishes to proceed.  Questions were answered.      OPERATIVE TECHNIQUE:  The patient was identified in the preoperative area by Dr. Yarbrough and brought back to the operating room.  I was called into the operating room after Dr. Yarrbough had completed his portion.  At that time, the patient was under general anesthesia, sterilely prepped and draped, in a stable condition.  She had an open wound on her forearm measuring 6.5 x 6.5 cm.  Muscle fascia and cephalic vein were exposed.  The  subcutaneous tissue had significant step-off making skin graft reconstruction suboptimal.  Therefore, a sterile Doppler was used to identify  from the area.  Two ulna artery perforators were identified near the defect, and a flap was designed based on those perforators.  Then, 1% lidocaine with epinephrine was injected surrounding incision site.  Then, a 15 blade was used to make the curvilinear incision including those 2 perforators.  Then, electrocautery was used to dissect down through the subcutaneous fat preserving any venous branches as well as small cutaneous nerves, and the flap was then elevated.  The perforators were identified and the flap was then inset into the defect and secured with 3-0 Vicryl sutures for the initial inset.  On the corners of the flap inset, there was excess tissues.  Those dog ears were excised, and the subcutaneous portion of it was used as a dermal fat graft in the center where there was some indentation of the tissues.  Then, the inset was finished using the 3-0 Vicryl sutures for the deep dermal layer, followed by 4-0 Prolene baseball running and interrupted sutures.  Bacitracin ointment, Adaptic, Kerlix, and Ace bandage were applied.  The patient tolerated the procedure well and awoke from anesthesia without difficulty.  All sponge and needle counts were correct at the end of the case.    Dictated By Julia Roman MD  d: 09/17/2024 17:25:34  t: 09/18/2024 03:22:35  Job [id number]  [facility]/

## 2024-09-19 ENCOUNTER — TELEPHONE (OUTPATIENT)
Dept: SURGERY | Facility: CLINIC | Age: 49
End: 2024-09-19

## 2024-09-19 NOTE — TELEPHONE ENCOUNTER
Reviewed pathology with patient, residual melanoma completely excised. She expressed understanding. No issues with incision site. Experiencing tongue numbness since surgery, informed her likely from anesthesia/intubation. Will CTM.     MITRA Hirsch

## 2024-09-19 NOTE — TELEPHONE ENCOUNTER
Called to check on patient after procedure.  Patient stated she is having no pain and is only taking tylenol twice a day.  Patient stated she is having some numbness on the tip of her tongue.  She stated she spoke with a nurse yesterday day who addressed it.  Informed patient to call if numbness becomes worse or if she develops any other symptoms.  Patient confirmed post op appointment for Monday.

## 2024-09-30 ENCOUNTER — OFFICE VISIT (OUTPATIENT)
Dept: SURGERY | Facility: CLINIC | Age: 49
End: 2024-09-30
Payer: COMMERCIAL

## 2024-09-30 DIAGNOSIS — C43.61 MALIGNANT MELANOMA OF RIGHT FOREARM (HCC): Primary | ICD-10-CM

## 2024-09-30 PROCEDURE — 99024 POSTOP FOLLOW-UP VISIT: CPT | Performed by: PHYSICIAN ASSISTANT

## 2024-09-30 PROCEDURE — 99024 POSTOP FOLLOW-UP VISIT: CPT

## 2024-09-30 NOTE — PROGRESS NOTES
Tonia Bhardwaj is a 49 year old female who presents today for a follow-up after wide excision, malignant melanoma, right forearm (4.5 cm) with Dr. Yarbrough followed by right forearm reconstruction with local flap, ulna  local rotation advancement flap, and dermal fat graft with Dr. Roman on 9/17/2024.     She denies fever and chills. She denies nausea, vomiting, diarrhea or constipation.   Her pain is controlled. She is not taking narcotic pain medication. She is here today for wound check and suture removal. She notes irritation from the neosporin that has since resolved after she stopped using it.     Pathology from 9/17/2024:    Final Diagnosis:   A.  Skin, right forearm, excision:  -Residual melanoma in situ.  -The margins of excision are negative for tumor.  -Scar, consistent with prior procedure.  -See comment.     B.  Skin, right forearm, excess 6:00 distal, excision:  -Skin with no evidence of malignancy.     C.  Skin, right forearm, excess 11:00 proximal, excision:  -Skin with no evidence of malignancy.     D.  Skin, right forearm, excess 6:00, excision:  -Skin with no evidence of malignancy.     Final Diagnosis Comment    A. The closest peripheral margin is 6:00 at 9 mm from melanoma in situ.       Physical Exam     Right forearm incision clean, dry and intact. Prolene sutures intact. Minimal irritation surrounding sutures. No wound drainage or wound dehiscence. No erythema. Flap viable. No evidence of hematoma/seroma.     There were no vitals filed for this visit.      Assessment and Plan     Tonia Bhardwaj is doing well s/p wide excision, malignant melanoma, right forearm (4.5 cm) with Dr. Yarbrough followed by right forearm reconstruction with local flap, ulna  local rotation advancement flap, and dermal fat graft with Dr. Roman on 9/17/2024.    Prolene sutures were removed. Vaseline, telfa and ace wrap were applied. She will change this dressing daily. She should continue  activity restrictions and wearing the ace wrap at all times as tolerated. She can adjust this as needed.  She was encouraged to call with questions/concerns. She will follow up with Dr. Roman on 10/16/2024.    Questions were answered. Patient understands.

## 2024-09-30 NOTE — PROGRESS NOTES
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 Monique Schaeffer-->  Punch biopsy done that reveals a 0.2 mm breslow depth.      She states she's had this mole since she was little and has noticed that the borders have gotten bigger and spreading. She does recall having a history of a blistering sunburn.     Interval History:    9/17/2024: s/p Wide excision melanoma right forearm (Linnea) Right forearm reconstruction with local flap and dermal fat graft   (Abel) --> Residual melanoma in situ completely excised, margins negative     Vital Signs:  LMP  (LMP Unknown)      Medications Reviewed:    Current Outpatient Medications:     traMADol 50 MG Oral Tab, Take 1 tablet (50 mg total) by mouth every 6 (six) hours as needed., Disp: 15 tablet, Rfl: 0    metoclopramide 10 MG Oral Tab, Take 1 tablet (10 mg total) by mouth every 8 (eight) hours as needed., Disp: 15 tablet, Rfl: 0    Cyanocobalamin (VITAMIN B-12 OR), Take by mouth., Disp: , Rfl:     montelukast 10 MG Oral Tab, Take 1 tablet (10 mg total) by mouth daily., Disp: , Rfl:     fexofenadine (ALLEGRA ALLERGY) 180 MG Oral Tab, Take 1 tablet (180 mg total) by mouth daily., Disp: , Rfl:     COMBIPATCH 0.05-0.14 MG/DAY Transdermal Patch Biweekly,  , Disp: , Rfl:      Allergies Reviewed:  Allergies   Allergen Reactions    Adhesive Tape RASH    Clindamycin RASH    Parabens RASH    Sulfamethoxazole RASH    Trimethoprim RASH        History:  Reviewed:  Past Medical History:    ADD (attention deficit disorder)    NO MEDICATION    Cancer (HCC)    MELANOMA    Cellulitis    Hidradenitis    Hx of motion sickness    Low blood pressure, not hypotension    PER PATIENT BP TENDS TO RUN ON THE LOWER SIDE    Visual impairment    GLASSES      Reviewed:  Past Surgical History:   Procedure Laterality Date    Patient denies any surgical history        Reviewed Social History:  Social History     Socioeconomic History    Marital status: Single   Tobacco Use    Smoking status: Never    Smokeless tobacco: Never   Vaping Use    Vaping status: Never Used   Substance and Sexual Activity    Alcohol use: Yes     Comment: RARE    Drug use: Never     Social Determinants of Health      Received from [facility]      Reviewed:  Family History   Problem Relation Age of Onset    Breast Cancer Mother       Review of Systems:  Doing well post-operatively  Denies fever or chills  Denies chest pain or SOB  Denies abdominal pain or N/V  Denies dysuria or hematuria  Denies warmth, erythema, or drainage at incision       Physical Examination:  Constitutional:  NAD.   Eyes: non-icteric.    Abdomen: Non-distended  Musculoskeletal: no edema.  Skin: Right forearm incision site healing well without erythema or drainage.      Document Review:  Final Diagnosis:   A.  Skin, right forearm, excision:  -Residual melanoma in situ.  -The margins of excision are negative for tumor.  -Scar, consistent with prior procedure.  -See comment.     B.  Skin, right forearm, excess 6:00 distal, excision:  -Skin with no evidence of malignancy.     C.  Skin, right forearm, excess 11:00 proximal, excision:  -Skin with no evidence of malignancy.     D.  Skin, right forearm, excess 6:00, excision:  -Skin with no  evidence of malignancy.     Final Diagnosis Comment    A. The closest peripheral margin is 6:00 at 9 mm from melanoma in situ.   Synoptic Report   INVASIVE MELANOMA OF THE SKIN: Excision, Re-Excision   8th Edition - Protocol posted: 12/13/2023INVASIVE MELANOMA OF THE SKIN: EXCISION, RE-EXCISION - All Specimens  SPECIMEN   Procedure  Excision   Specimen Laterality  Right   TUMOR   Tumor Site  Skin of upper limb and / or shoulder: Forearm   Macroscopic Satellite Lesion(s)  Not identified   Histologic Type  Low-cumulative sun damage (CSD) melanoma (including superficial spreading melanoma)   Maximum Tumor (Breslow) Thickness (Millimeters)  0.2 mm (see prior biopsy [id number]; no residual invasive tumor on excision) mm   Ulceration  Not identified   Mitotic Rate  None identified   Microsatellite(s)  Not identified   Lymphatic and / or Vascular Invasion  Not identified   Neurotropism  Not identified   MARGINS   Margin Status for Invasive Melanoma  All margins negative for invasive melanoma   Distance from Invasive Melanoma to Peripheral Margin  Cannot be determined: no residual invasive tumor on excision   Distance from Invasive Melanoma to Deep Margin  Cannot be determined: no residual invasive tumor on excision   Margin Status for Melanoma In situ  All margins negative for melanoma in situ   Distance from Melanoma In Situ to Peripheral Margin  9 mm   REGIONAL LYMPH NODES   Regional Lymph Node Status  Not applicable (no regional lymph nodes submitted or found)   pTNM CLASSIFICATION (AJCC 8th Edition)   Reporting of pT, pN, and (when applicable) pM categories is based on information available to the pathologist at the time the report is issued. As per the AJCC (Chapter 1, 8th Ed.) it is the managing physician’s responsibility to establish the final pathologic stage based upon all pertinent information, including but potentially not limited to this pathology report.   Prior Procedure Classification  Classification assigned  in this report includes information from a prior procedure: Tumor depth   pT Category  pT1a   pN Category  pN not assigned (no nodes submitted or found)           Procedure(s):  None     Assessment / Plan:  Malignant melanoma of right forearm (HCC)   - No acute surgical issues  - Incision site healing well without erythema or drainage. Further management per plastic surgery team  - Pathology reviewed with patient. She expressed understanding. Residual melanoma completely excised with appropriate negative margins. Patient understands to continue to follow with Dermatology and our office for skin checks every 3-6 months.   - Reviewed signs and symptoms to return to clinic sooner    I met with Tonia Benton to provide a survivorship care plan and information related to post-treatment care. She has a history of malignant melanoma of the right forearm.     Current condition/issues:    Survivorship Care Plan: The patient was provided a hard copy of the survivorship care plan. We reviewed all elements of the document. She is aware a copy was also sent to her PCP.     Reviewed melanoma specific surveillance to include follow-up with dermatology at 3 months and follow-up with surgical oncology (Dr. Yarbrough) at 6 months. She will also continue monthly skin exams to include regional lymph node basin.     Reviewed schedule of other clinic visits: Follow-up with your primary care physician to manage all general health care recommendations for your age and gender including cancer screening tests.    Reviewed concerning signs and symptoms that she should report to any provider including the ABCDEs of melanoma.     Reviewed possible late and long-term effects related to treatment including numbness/tingling. Lymphedema less likely as no lymph nodes were removed.     Reviewed common cancer survivor issues and resource available.     Reviewed lifestyle/behaviors that can affect ongoing health including sun safety and maintaining a  healthy weight and diet.    The patient received a copy of their survivorship care plan.    The patient was given the opportunity to ask questions. She was encouraged to call with any further questions or concerns.        Follow Up:  Dermatology 3 months  Dr Yarbrough 6 months       Electronically Signed by: MITRA Hirsch

## 2024-10-01 DIAGNOSIS — C43.61 MALIGNANT MELANOMA OF RIGHT FOREARM (HCC): Primary | ICD-10-CM

## 2024-10-01 PROBLEM — J06.9 ACUTE UPPER RESPIRATORY INFECTION: Status: RESOLVED | Noted: 2024-08-20 | Resolved: 2024-10-01

## 2024-10-15 NOTE — PROGRESS NOTES
Tonia Bhardwaj is a 49 year old female who presents today for a follow-up after wide excision, malignant melanoma, right forearm (4.5 cm) with Dr. Yarbrough followed by right forearm reconstruction with local flap, ulna  local rotation advancement flap, and dermal fat graft on 9/17/2024.     She denies fever and chills. She denies nausea, vomiting, diarrhea or constipation.   Her pain is controlled.  She is here today for wound re-check.     Pathology from 9/17/2024:    Final Diagnosis:   A.  Skin, right forearm, excision:  -Residual melanoma in situ.  -The margins of excision are negative for tumor.  -Scar, consistent with prior procedure.  -See comment.     B.  Skin, right forearm, excess 6:00 distal, excision:  -Skin with no evidence of malignancy.     C.  Skin, right forearm, excess 11:00 proximal, excision:  -Skin with no evidence of malignancy.     D.  Skin, right forearm, excess 6:00, excision:  -Skin with no evidence of malignancy.      Final Diagnosis Comment     A. The closest peripheral margin is 6:00 at 9 mm from melanoma in situ.       Physical Exam     Right forearm incision clean, dry and intact. No wound drainage or wound dehiscence. No erythema. Flap viable. No evidence of hematoma/seroma.     There were no vitals filed for this visit.      Assessment and Plan     Tonia Bhardwaj is doing well s/p wide excision, malignant melanoma, right forearm (4.5 cm) with Dr. Yarbrough followed by right forearm reconstruction with local flap, ulna  local rotation advancement flap, and dermal fat graft on 9/17/2024.     We reviewed options for scar management including vitamin E oil, silicone products, scar massage and sun protection/sun block.     She will follow up in 6 months for scar check. She was encouraged to contact our office with any questions or concerns.     Questions were answered. Patient understands.

## 2024-10-16 ENCOUNTER — OFFICE VISIT (OUTPATIENT)
Dept: SURGERY | Facility: CLINIC | Age: 49
End: 2024-10-16
Payer: COMMERCIAL

## 2024-10-16 DIAGNOSIS — C43.61 MALIGNANT MELANOMA OF RIGHT FOREARM (HCC): Primary | ICD-10-CM

## 2024-10-16 PROCEDURE — 99024 POSTOP FOLLOW-UP VISIT: CPT | Performed by: SURGERY

## 2025-03-06 ENCOUNTER — TELEPHONE (OUTPATIENT)
Dept: SURGERY | Facility: CLINIC | Age: 50
End: 2025-03-06

## 2025-03-06 NOTE — TELEPHONE ENCOUNTER
Spoke with patient in regards to appointment on 4/2 for scar check. Patient reports she is doing well and has no concerns at this time. Patient would like to cancel this appointment. She was encouraged to reach back out to our office with any questions or concerns.

## (undated) DEVICE — GEL US 20 GM PKT ST AQSNC 100

## (undated) DEVICE — ANTIBACTERIAL UNDYED BRAIDED (POLYGLACTIN 910), SYNTHETIC ABSORBABLE SUTURE: Brand: COATED VICRYL

## (undated) DEVICE — PACK UNIVERSAL DRAPE

## (undated) DEVICE — SUT PERMA- 2-0 30IN SH NABSRB BLK L26MM 1/

## (undated) DEVICE — SLEEVE COMPR MD KNEE LEN SGL USE KENDALL SCD

## (undated) DEVICE — SHEET, DRAPE, SPLIT, STERILE: Brand: MEDLINE

## (undated) DEVICE — 3M™ STERI-STRIP™ REINFORCED ADHESIVE SKIN CLOSURES, R1547, 1/2 IN X 4 IN (12 MM X 100 MM), 6 STRIPS/ENVELOPE: Brand: 3M™ STERI-STRIP™

## (undated) DEVICE — MARKER SKIN PREP-RESISTANT ST: Brand: MEDLINE INDUSTRIES, INC.

## (undated) DEVICE — SUT PROL 4-0 36IN RB-1 NABSRB BLU 17MM 1/2 CI

## (undated) DEVICE — E-Z CLEAN, NON-STICK, PTFE COATED, ELECTROSURGICAL NEEDLE ELECTRODE, MODIFIED EXTENDED INSULATION, 2.75 INCH (7 CM): Brand: MEGADYNE

## (undated) DEVICE — GLOVE SUR 6.5 SENSICARE PI PIP CRM PWD F

## (undated) DEVICE — YANKAUER,FLEXIBLE HANDLE,REGLR CAPACITY: Brand: MEDLINE INDUSTRIES, INC.

## (undated) DEVICE — 3M™ IOBAN™ 2 ANTIMICROBIAL INCISE DRAPE 6650EZ: Brand: IOBAN™ 2

## (undated) DEVICE — ATTAHJA VAC WITH 22MM CONNECTR

## (undated) DEVICE — ELECTRODE ES 2.75IN PTFE BLDE MOD E-Z CLN

## (undated) DEVICE — CLIP LIG M BLU TI HRT SHP WIRE HORZ

## (undated) DEVICE — SUT COAT VCRL 2-0 27IN SH ABSRB UD 26MM 1/2

## (undated) DEVICE — PAD DSG 8X12IN THK0.5IN FOAM SIL BK ADH

## (undated) DEVICE — STOCKINETTE,IMPERVIOUS,12X48,STERILE: Brand: MEDLINE

## (undated) DEVICE — PACK DRAPE HEAD/NECK STER

## (undated) DEVICE — STERILE ULTRASOUND GEL, SAFEWRAP: Brand: ECOVUE

## (undated) DEVICE — SOLUTION IRRIG 1000ML 0.9% NACL USP BTL

## (undated) DEVICE — STOCKINETTE SUR 9X48IN IMPERV FOR HANDLING

## (undated) DEVICE — STERILE SYNTHETIC POLYISOPRENE POWDER-FREE SURGICAL GLOVES WITH HYDROGEL COATING, SMOOTH FINISH, STRAIGHT FINGER: Brand: PROTEXIS

## (undated) DEVICE — DRESSING ADAPTIC L16XW3IN OIL ADH

## (undated) DEVICE — SHEET,DRAPE,40X58,STERILE: Brand: MEDLINE

## (undated) DEVICE — BLADE 24 SS SRG STRL

## (undated) DEVICE — BNDG,ELSTC,MATRIX,STRL,4"X5YD,LF,HOOK&LP: Brand: MEDLINE

## (undated) DEVICE — SPONGE 4X4 10PK

## (undated) DEVICE — GLOVE SUR 7 SENSICARE PI PIP GRN PWD F

## (undated) DEVICE — PROXIMATE SKIN STAPLERS (35 WIDE) CONTAINS 35 STAINLESS STEEL STAPLES (FIXED HEAD): Brand: PROXIMATE

## (undated) DEVICE — PAD,NON-ADHERENT,3X8,STERILE,LF,1/PK: Brand: MEDLINE